# Patient Record
Sex: FEMALE | Race: WHITE | NOT HISPANIC OR LATINO | Employment: UNEMPLOYED | URBAN - METROPOLITAN AREA
[De-identification: names, ages, dates, MRNs, and addresses within clinical notes are randomized per-mention and may not be internally consistent; named-entity substitution may affect disease eponyms.]

---

## 2017-02-07 RX ORDER — VALACYCLOVIR HYDROCHLORIDE 500 MG/1
500 TABLET, FILM COATED ORAL 2 TIMES DAILY
Qty: 10 TABLET | Refills: 0 | Status: SHIPPED | OUTPATIENT
Start: 2017-02-07 | End: 2017-05-13 | Stop reason: SDUPTHER

## 2017-02-07 RX ORDER — VALACYCLOVIR HYDROCHLORIDE 500 MG/1
500 TABLET, FILM COATED ORAL 2 TIMES DAILY
COMMUNITY
Start: 2017-02-07 | End: 2017-02-07 | Stop reason: SDUPTHER

## 2017-02-07 NOTE — TELEPHONE ENCOUNTER
----- Message from Lisy Toro sent at 2/6/2017  2:11 PM CST -----  Contact: Self  Pt calling to speak to nurse regarding meds Please call [phone]

## 2017-05-15 ENCOUNTER — TELEPHONE (OUTPATIENT)
Dept: FAMILY MEDICINE | Facility: CLINIC | Age: 42
End: 2017-05-15

## 2017-05-15 RX ORDER — VALACYCLOVIR HYDROCHLORIDE 500 MG/1
500 TABLET, FILM COATED ORAL 2 TIMES DAILY
Qty: 10 TABLET | Refills: 0 | Status: SHIPPED | OUTPATIENT
Start: 2017-05-15

## 2017-05-15 NOTE — TELEPHONE ENCOUNTER
I called the patient, and advised the patient of the provider's recommendations to schedule an appointment for a physical with a new PCP, and Rx has been refilled, and sent to your pharmacy. The patient verbalized understanding of the provider's recommendations.

## 2017-07-22 ENCOUNTER — HOSPITAL ENCOUNTER (EMERGENCY)
Facility: OTHER | Age: 42
Discharge: HOME | End: 2017-07-22
Attending: EMERGENCY MEDICINE
Payer: MEDICAID

## 2017-07-22 VITALS
HEART RATE: 82 BPM | HEIGHT: 64 IN | SYSTOLIC BLOOD PRESSURE: 130 MMHG | DIASTOLIC BLOOD PRESSURE: 80 MMHG | BODY MASS INDEX: 44.39 KG/M2 | OXYGEN SATURATION: 100 % | RESPIRATION RATE: 20 BRPM | WEIGHT: 260 LBS | TEMPERATURE: 98 F

## 2017-07-22 DIAGNOSIS — F41.9 ANXIETY: ICD-10-CM

## 2017-07-22 DIAGNOSIS — R20.2 PARESTHESIA OF FOOT, BILATERAL: Primary | ICD-10-CM

## 2017-07-22 DIAGNOSIS — R53.1 WEAK: ICD-10-CM

## 2017-07-22 DIAGNOSIS — R20.2 PARESTHESIA OF BOTH HANDS: ICD-10-CM

## 2017-07-22 DIAGNOSIS — R42 DIZZY: ICD-10-CM

## 2017-07-22 DIAGNOSIS — R20.0 NUMBNESS AND TINGLING IN BOTH HANDS: ICD-10-CM

## 2017-07-22 DIAGNOSIS — R20.2 NUMBNESS AND TINGLING IN BOTH HANDS: ICD-10-CM

## 2017-07-22 LAB
ALBUMIN SERPL-MCNC: 3.4 G/DL (ref 3.3–5.5)
ALP SERPL-CCNC: 67 U/L (ref 42–141)
B-HCG UR QL: NEGATIVE
BILIRUB SERPL-MCNC: 0.4 MG/DL (ref 0.2–1.6)
BUN SERPL-MCNC: 7 MG/DL (ref 7–22)
CALCIUM SERPL-MCNC: 9.5 MG/DL (ref 8–10.3)
CHLORIDE SERPL-SCNC: 107 MMOL/L (ref 98–108)
CREAT SERPL-MCNC: 0.7 MG/DL (ref 0.6–1.2)
CTP QC/QA: YES
GLUCOSE SERPL-MCNC: 124 MG/DL (ref 73–118)
POC ALT (SGPT): 51 U/L (ref 10–47)
POC AST (SGOT): 52 U/L (ref 11–38)
POC TCO2: 25 MMOL/L (ref 18–33)
POTASSIUM BLD-SCNC: 4.8 MMOL/L (ref 3.6–5.1)
PROTEIN, POC: 7.6 G/DL (ref 6.4–8.1)
SODIUM BLD-SCNC: 141 MMOL/L (ref 128–145)

## 2017-07-22 PROCEDURE — 81025 URINE PREGNANCY TEST: CPT | Performed by: EMERGENCY MEDICINE

## 2017-07-22 PROCEDURE — 93005 ELECTROCARDIOGRAM TRACING: CPT

## 2017-07-22 PROCEDURE — 85025 COMPLETE CBC W/AUTO DIFF WBC: CPT

## 2017-07-22 PROCEDURE — 93010 ELECTROCARDIOGRAM REPORT: CPT | Mod: ,,, | Performed by: INTERNAL MEDICINE

## 2017-07-22 PROCEDURE — 25000003 PHARM REV CODE 250: Performed by: EMERGENCY MEDICINE

## 2017-07-22 PROCEDURE — 99284 EMERGENCY DEPT VISIT MOD MDM: CPT | Mod: 25

## 2017-07-22 PROCEDURE — 80053 COMPREHEN METABOLIC PANEL: CPT

## 2017-07-22 RX ORDER — DIAZEPAM 5 MG/1
10 TABLET ORAL EVERY 6 HOURS PRN
Qty: 15 TABLET | Refills: 0 | Status: SHIPPED | OUTPATIENT
Start: 2017-07-22 | End: 2017-08-21

## 2017-07-22 RX ORDER — LORAZEPAM 1 MG/1
1 TABLET ORAL
Status: DISCONTINUED | OUTPATIENT
Start: 2017-07-22 | End: 2017-07-22

## 2017-07-22 RX ORDER — DIAZEPAM 5 MG/1
10 TABLET ORAL
Status: COMPLETED | OUTPATIENT
Start: 2017-07-22 | End: 2017-07-22

## 2017-07-22 RX ADMIN — DIAZEPAM 10 MG: 5 TABLET ORAL at 12:07

## 2017-07-22 NOTE — ED TRIAGE NOTES
Pt states she has been under a lot of stress, states on yesterday she had a panic attack, and today when she woke up her tongue felt heavy and her toes were numb on both feet

## 2017-07-22 NOTE — ED PROVIDER NOTES
Encounter Date: 7/22/2017       History     Chief Complaint   Patient presents with    Panic Attack     states she had a panic attack yesterday, states today her tongue feels heavy, dizzy and toes feels numb     HPI  Review of patient's allergies indicates:  No Known Allergies  No past medical history on file.  Past Surgical History:   Procedure Laterality Date    CHOLECYSTECTOMY      TONSILLECTOMY       Family History   Problem Relation Age of Onset    Diabetes Mother     Cancer Father      lung    Cancer Paternal Uncle      throat    Diabetes Paternal Grandmother     Stroke Neg Hx     Heart disease Neg Hx      Social History   Substance Use Topics    Smoking status: Never Smoker    Smokeless tobacco: Not on file    Alcohol use No     Review of Systems    Physical Exam     Initial Vitals   BP Pulse Resp Temp SpO2   -- -- -- -- --      MAP       --         Physical Exam    ED Course   Procedures  Labs Reviewed - No data to display                            ED Course     Clinical Impression:   {Add your Clinical Impression here. If you haven't documented one yet, please pend the note, finalize a Clinical Impression, and refresh your note before signing.:[id number]}

## 2017-07-22 NOTE — ED PROVIDER NOTES
Encounter Date: 7/22/2017       History     Chief Complaint   Patient presents with    Panic Attack     states she had a panic attack yesterday, states today her tongue feels heavy, dizzy and toes feels numb     Chief complaint: Numbness to toes and fingers  41-year-old who began having numbness to her fingers and toes yesterday.  Patient said that she is under a lot of stress.  She had received bad news yesterday and became very anxious and was hyperventilating.  She also is having difficulty speaking.  Theses symptoms resolved yesterday.  When she awoke about an hour ago, she had similar symptoms.  She denies headache or change in vision.  She is walking normally.  She is having heaviness to her tongue.  Patient is prescribed Xanax but doesn't take it.  She denies chest pain or shortness of breath          Review of patient's allergies indicates:  No Known Allergies  No past medical history on file.  Past Surgical History:   Procedure Laterality Date    CHOLECYSTECTOMY      TONSILLECTOMY       Family History   Problem Relation Age of Onset    Diabetes Mother     Cancer Father      lung    Cancer Paternal Uncle      throat    Diabetes Paternal Grandmother     Stroke Neg Hx     Heart disease Neg Hx      Social History   Substance Use Topics    Smoking status: Never Smoker    Smokeless tobacco: Not on file    Alcohol use No     Review of Systems   Constitutional: Negative for fever.   HENT: Negative for sore throat and trouble swallowing.    Eyes: Negative for visual disturbance.   Respiratory: Negative for shortness of breath.    Cardiovascular: Negative for chest pain.   Gastrointestinal: Negative for nausea.   Genitourinary: Negative for dysuria.   Musculoskeletal: Negative for back pain.   Skin: Negative for rash.   Neurological: Positive for speech difficulty and numbness. Negative for weakness.   Hematological: Does not bruise/bleed easily.   Psychiatric/Behavioral: The patient is nervous/anxious.         Physical Exam     Initial Vitals   BP Pulse Resp Temp SpO2   -- -- -- -- --      MAP       --         Physical Exam    Nursing note and vitals reviewed.  Constitutional: She appears well-developed and well-nourished.   HENT:   Head: Normocephalic and atraumatic.   slightSwelling under tongue with slurred speech , tongue deviated to the left.    Eyes: Conjunctivae and EOM are normal. Pupils are equal, round, and reactive to light.   Neck: Normal range of motion. Neck supple.   Cardiovascular: Normal rate, regular rhythm and intact distal pulses.   Pulmonary/Chest: Breath sounds normal. No respiratory distress.   Abdominal: Soft. There is no tenderness. There is no rebound and no guarding.   Musculoskeletal: Normal range of motion.   Neurological: She is alert and oriented to person, place, and time. She has normal strength. She displays abnormal reflex. A sensory deficit (decreased sensation fingertips and toes) is present. GCS eye subscore is 4. GCS verbal subscore is 5. GCS motor subscore is 6.   Somewhat slurred speech   Skin: Skin is warm and dry.   Psychiatric: Her mood appears anxious.         ED Course   Procedures  Labs Reviewed   POCT URINE PREGNANCY   POCT CBC   POCT CMP     EKG Readings: (Independently Interpreted)   NSR HR 74, no ST segment elevation, inverted T-wave in lead 3, sinus arrhythmia          Medical Decision Making:   Initial Assessment:   41-year-old who presents with numbness and tingling to her toes and her fingertips.  On exam patient also has somewhat slurred speech.  Her tongue is deviated to the left inside of her mouth she is unable to stick her tongue out of her mouth.  I do detect some swelling underneath the tongue.  She has decreased fine touch to her fingertips and toes.   she also has decreased sensation around the mouth.    No gross weakness  Clinical Tests:   Lab Tests: Ordered and Reviewed  The following lab test(s) were unremarkable: CBC and CMP  Radiological Study:  Ordered and Reviewed  Medical Tests: Ordered and Reviewed  ED Management:  Patient presents with paresthesias to her toes and  her fingertips.  Patient has good strength to upper and lower extremities.  She has no facial asymmetry.  She had swelling underneath her tongue.  Patient's tongue was also displaced to the left.  This was causing the slurred speech.  After I examined the patient's tongue and palpated it, her speech returned to normal immediately.  She was able to stick her tongue out completely after I palpated her tongue.  Patient will be evaluated with a head CT as well as electrolytes.     Etiology of the patient's symptoms are likely secondary to anxiety.  I detect no anatomical reason why the patient would have paresthesias to her fingertips, toes and periorbital area other than anxiety.  Also, when I touched the patient's tongue it moved back into position.  She has had normal speech since that time.  There  no abnormalities found on patient's lab work or head CT.  She mentions numerous times that she is under a lot of stress.  Patient will be given Valium in the emergency room and discharged on the same.  She does see a neurologist for headaches and will follow up with her neurologist.                   ED Course     Clinical Impression:   The primary encounter diagnosis was Paresthesia of foot, bilateral. Diagnoses of Dizzy, Numbness and tingling in both hands, Weak, Paresthesia of both hands, and Anxiety were also pertinent to this visit.                           Mayela Carpenter MD  07/22/17 1257

## 2024-10-12 ENCOUNTER — HOSPITAL ENCOUNTER (EMERGENCY)
Facility: HOSPITAL | Age: 49
Discharge: HOME | End: 2024-10-12
Attending: EMERGENCY MEDICINE
Payer: MEDICAID

## 2024-10-12 VITALS
HEIGHT: 65 IN | BODY MASS INDEX: 38.32 KG/M2 | RESPIRATION RATE: 19 BRPM | HEART RATE: 82 BPM | TEMPERATURE: 99 F | SYSTOLIC BLOOD PRESSURE: 175 MMHG | OXYGEN SATURATION: 98 % | DIASTOLIC BLOOD PRESSURE: 84 MMHG | WEIGHT: 230 LBS

## 2024-10-12 DIAGNOSIS — S09.90XA INJURY OF HEAD, INITIAL ENCOUNTER: Primary | ICD-10-CM

## 2024-10-12 DIAGNOSIS — D35.2 PITUITARY ADENOMA: ICD-10-CM

## 2024-10-12 LAB
B-HCG UR QL: NEGATIVE
CTP QC/QA: YES

## 2024-10-12 PROCEDURE — 25000003 PHARM REV CODE 250: Mod: ER | Performed by: NURSE PRACTITIONER

## 2024-10-12 PROCEDURE — 99285 EMERGENCY DEPT VISIT HI MDM: CPT | Mod: 25,ER

## 2024-10-12 PROCEDURE — 81025 URINE PREGNANCY TEST: CPT | Mod: ER | Performed by: NURSE PRACTITIONER

## 2024-10-12 PROCEDURE — 81025 URINE PREGNANCY TEST: CPT | Mod: ER

## 2024-10-12 RX ORDER — ACETAMINOPHEN 500 MG
1000 TABLET ORAL
Status: COMPLETED | OUTPATIENT
Start: 2024-10-12 | End: 2024-10-12

## 2024-10-12 RX ORDER — MUPIROCIN 20 MG/G
OINTMENT TOPICAL 3 TIMES DAILY
Qty: 15 G | Refills: 0 | Status: SHIPPED | OUTPATIENT
Start: 2024-10-12 | End: 2024-10-17

## 2024-10-12 RX ORDER — ACETAMINOPHEN 500 MG
500 TABLET ORAL EVERY 4 HOURS PRN
Qty: 20 TABLET | Refills: 0 | Status: SHIPPED | OUTPATIENT
Start: 2024-10-12

## 2024-10-12 RX ADMIN — ACETAMINOPHEN 1000 MG: 500 TABLET ORAL at 06:10

## 2024-10-12 RX ADMIN — BACITRACIN ZINC, NEOMYCIN, POLYMYXIN B 1 EACH: 400; 3.5; 5 OINTMENT TOPICAL at 07:10

## 2024-10-12 NOTE — ED PROVIDER NOTES
Encounter Date: 10/12/2024       History     Chief Complaint   Patient presents with    Dizziness    Head Injury     A 48 y/o female presents to the ER c/o Dizziness and Head Injury x 15 minutes. Pt reports closing trunk at time of injury. Pt is unsure of LOC. +Dizziness +Weakness +Severe HA.      CC: Head injury    HPI:  49-year-old female presenting to the ED for evaluation of head injury.  Patient hit her head on the car trunk 30 minutes prior to arrival.  No LOC, however she did feel stunned after the incident.  Reports associated headache and dizziness.  No attempted treatment.  She is not taking blood thinner medications.  No emesis or visual disturbance.  Her  is at the bedside.    The history is provided by the patient and the spouse. No  was used.     Review of patient's allergies indicates:  No Known Allergies  No past medical history on file.  Past Surgical History:   Procedure Laterality Date    CHOLECYSTECTOMY      TONSILLECTOMY       Family History   Problem Relation Name Age of Onset    Diabetes Mother      Cancer Father          lung    Cancer Paternal Uncle          throat    Diabetes Paternal Grandmother      Stroke Neg Hx      Heart disease Neg Hx       Social History     Tobacco Use    Smoking status: Never   Substance Use Topics    Alcohol use: No    Drug use: No     Review of Systems   Constitutional:  Negative for chills and fever.   HENT:  Negative for sore throat.    Respiratory:  Negative for shortness of breath.    Cardiovascular:  Negative for chest pain.   Gastrointestinal:  Negative for nausea.   Genitourinary:  Negative for dysuria.   Musculoskeletal:  Negative for back pain.   Skin:  Negative for rash.   Neurological:  Positive for dizziness and headaches. Negative for weakness.   Hematological:  Does not bruise/bleed easily.       Physical Exam     Initial Vitals [10/12/24 1723]   BP Pulse Resp Temp SpO2   (!) 156/84 88 18 98.6 °F (37 °C) 97 %      MAP        --         Physical Exam    Constitutional: She appears well-developed and well-nourished. She is not diaphoretic. No distress.   Pleasant and well-appearing.  Nondistressed.   HENT:   Head: Normocephalic. Head is with contusion and with laceration.       Neck:   No midline tenderness of the C-spine.   Normal range of motion.  Pulmonary/Chest: No respiratory distress.   Musculoskeletal:         General: Normal range of motion.      Cervical back: Normal range of motion.      Comments: Ambulatory with normal gait.     Neurological: She is alert and oriented to person, place, and time.   Skin: Skin is warm and dry.   Psychiatric: She has a normal mood and affect. Her behavior is normal.         ED Course   Procedures  Labs Reviewed   POCT URINE PREGNANCY       Result Value    POC Preg Test, Ur Negative       Acceptable Yes            Imaging Results              CT Cervical Spine Without Contrast (Final result)  Result time 10/12/24 18:40:12      Final result by Bennie Donnelly MD (10/12/24 18:40:12)                   Impression:      No acute cervical fracture.      Electronically signed by: Bennie Donnelly  Date:    10/12/2024  Time:    18:40               Narrative:    EXAMINATION:  CT CERVICAL SPINE WITHOUT CONTRAST    CLINICAL HISTORY:  Neck trauma, dangerous injury mechanism (Age 16-64y);    TECHNIQUE:  Low dose axial images, sagittal and coronal reformations were performed though the cervical spine.  Contrast was not administered.    COMPARISON:  None    FINDINGS:  No acute cervical fracture.    The prevertebral soft tissues are unremarkable.    Degenerative changes at C5-6 with disc bulging endplate osteophyte formation but no evidence to suggest overt cord compression.    The presumed pituitary adenoma extending towards right cavernous sinus again identified.    These findings were communicated to Deena Velazquez NP at 18:40 on 10/12/2024.                                       CT Head Without  Contrast (Final result)  Result time 10/12/24 18:30:48      Final result by Bennie Donnelly MD (10/12/24 18:30:48)                   Impression:      No evidence of intracranial hemorrhage/injury..    Enlargement of the pituitary gland nonspecific but may reflect a pituitary adenoma with some encroachment on the optic chiasm, new from 2017.  MR imaging of the sella at some point recommended for further evaluation.      Electronically signed by: Bennie Donnelly  Date:    10/12/2024  Time:    18:30               Narrative:    EXAMINATION:  CT HEAD WITHOUT CONTRAST    CLINICAL HISTORY:  Head trauma, moderate-severe;    TECHNIQUE:  Low dose axial images were obtained through the head.  Coronal and sagittal reformations were also performed. Contrast was not administered.    COMPARISON:  07/22/2017    FINDINGS:  No evidence of hydrocephalus, mass effect, intracranial hemorrhage or acute territorial infarct.  No parenchymal contusion is identified.    The brain parenchyma maintains normal attenuation.  There is enlargement of the pituitary gland that may reflect underlying pituitary adenoma and appears new from the prior study of 2017.    The calvarium is intact. The visualized sinuses and mastoid air cells are clear.                                       Medications   acetaminophen tablet 1,000 mg (1,000 mg Oral Given 10/12/24 1840)   neomycin-bacitracnZn-polymyxnB packet (1 each Topical (Top) Given 10/12/24 1913)     Medical Decision Making  This is the evaluation of 49-year-old female presenting to the ED for evaluation of head injury.  She is AAOx4 in no acute distress.  Contusion noted with small abrasion to left forehead.  There is no c-spine tenderness.     Vital signs are reassuring. RESULTS:  CT cervical spine negative for acute cervical fracture.  CT head negative for acute intracranial hemorrhage or injury.  Incidental finding of enlargement of pituitary gland noted.  May reflect pituitary adenoma with some  encroachment on the optic chiasm, new from 2017.  Recommend outpatient MR imaging per Dr. Donnelly with Radiology.  Patient denies any visual disturbance or visual field deficits.  I have placed a referral for outpatient follow-up with Neurosurgery.    Given the above findings, my overall impression is head injury. I considered, but at this time, do not suspect SAH/ICH, Skull/Spine/or other Bony Fracture, Dislocation, or Subluxation.     The diagnosis, treatment plan, instructions for follow-up and reevaluation with PCP and Neurosurgery as well as ED return precautions were discussed and understanding was verbalized. All questions or concerns have been addressed.     This case was discussed with Dr. Humphries who is in agreement with my assessment and plan.     Amount and/or Complexity of Data Reviewed  Labs: ordered.  Radiology: ordered. Decision-making details documented in ED Course.    Risk  OTC drugs.  Prescription drug management.               ED Course as of 10/12/24 1927   Sat Oct 12, 2024   1835 CT Head Without Contrast  Impression:     No evidence of intracranial hemorrhage/injury..     Enlargement of the pituitary gland nonspecific but may reflect a pituitary adenoma with some encroachment on the optic chiasm, new from 2017.  MR imaging of the sella at some point recommended for further evaluation.      [MM]   1848 CT Cervical Spine Without Contrast  FINDINGS:  No acute cervical fracture.     The prevertebral soft tissues are unremarkable.     Degenerative changes at C5-6 with disc bulging endplate osteophyte formation but no evidence to suggest overt cord compression.     The presumed pituitary adenoma extending towards right cavernous sinus again identified.     These findings were communicated to Deena Velazquez NP at 18:40 on 10/12/2024.     Impression:     No acute cervical fracture.      [MM]      ED Course User Index  [MM] Deena Velazquez NP                           Clinical Impression:  Final  diagnoses:  [D35.2] Pituitary adenoma  [S09.90XA] Injury of head, initial encounter (Primary)          ED Disposition Condition    Discharge Stable          ED Prescriptions       Medication Sig Dispense Start Date End Date Auth. Provider    acetaminophen (TYLENOL) 500 MG tablet Take 1 tablet (500 mg total) by mouth every 4 (four) hours as needed for Pain. 20 tablet 10/12/2024 -- Deena Velazquez NP    mupirocin (BACTROBAN) 2 % ointment Apply topically 3 (three) times daily. for 5 days 15 g 10/12/2024 10/17/2024 Deean Velazquez NP          Follow-up Information       Follow up With Specialties Details Why Contact Info Additional Information    Orlando Moscoso MD Family Medicine Schedule an appointment as soon as possible for a visit  [facility] [address] [facility] [address]  [phone]       [facility] [address] - Neurosurgery [facility] Neurosurgery Schedule an appointment as soon as possible for a visit  For follow-up---enlarged pituitary gland on CT scan of head [address] [facility] [address] [phone] 8th Floor [facility] [place] Please park in [facility]. Check in desk is located in the [facility]. Please take the C elevator to 8th floor which opens to the [facility].    [facility] - [facility] ED Emergency Medicine Go to  If symptoms worsen [address] [facility] [id number] [phone]              Deena Velazquez NP  10/12/24 1927

## 2024-10-13 NOTE — DISCHARGE INSTRUCTIONS
Thank you for coming to our Emergency Department today. It is important to remember that some problems or medical conditions are difficult to diagnose and may not be found during your Emergency Department visit.     Be sure to follow up with your primary care doctor and review all labs/imaging/tests that were performed during your ER visit with them. Some labs/tests may be outside of the normal range and require non-emergent follow-up and further investigation to help diagnose/exclude/prevent complications or other potentially serious medical conditions that were not addressed during your ER visit.    If you do not have a primary care doctor, you may contact the one listed on your discharge paperwork or you may also call the [facility] Appointment Desk at [phone] to schedule an appointment and establish care with one. It is important to your health that you have a primary care doctor.    Please take all medications as directed. All medications may potentially have side-effects and it is impossible to predict which medications may give you side-effects or what side-effects (if any) they will give you.. If you feel that you are having a negative effect or side-effect of any medication you should immediately stop taking them and seek medical attention. If you feel that you are having a life-threatening reaction call 911.    Return to the ER with any questions/concerns, new/concerning symptoms, worsening or failure to improve.     Do not drive, swim, climb to height, take a bath, operate heavy machinery, drink alcohol or take potentially sedating medications, sign any legal documents or make any important decisions for 24 hours if you have received any pain medications, sedatives or mood altering drugs during your ER visit or within 24 hours of taking them if they have been prescribed to you.     You can find additional resources for Dentists, hearing aids, durable medical equipment, low cost pharmacies and  other resources at [email or web address]

## 2024-10-15 ENCOUNTER — PATIENT MESSAGE (OUTPATIENT)
Dept: RADIOLOGY | Facility: HOSPITAL | Age: 49
End: 2024-10-15
Payer: MEDICAID